# Patient Record
Sex: MALE | Race: ASIAN | NOT HISPANIC OR LATINO | Employment: UNEMPLOYED | URBAN - METROPOLITAN AREA
[De-identification: names, ages, dates, MRNs, and addresses within clinical notes are randomized per-mention and may not be internally consistent; named-entity substitution may affect disease eponyms.]

---

## 2024-05-03 PROBLEM — I10 BENIGN ESSENTIAL HYPERTENSION: Status: ACTIVE | Noted: 2024-05-03

## 2024-05-03 PROBLEM — E11.65 TYPE 2 DIABETES MELLITUS WITH HYPERGLYCEMIA  (CMD): Status: ACTIVE | Noted: 2024-05-03

## 2024-05-03 PROBLEM — K40.90 RIGHT INGUINAL HERNIA: Status: ACTIVE | Noted: 2024-05-03

## 2024-05-03 PROBLEM — E78.5 HYPERLIPIDEMIA: Status: ACTIVE | Noted: 2024-05-03

## 2024-05-03 PROBLEM — K21.00 GASTROESOPHAGEAL REFLUX DISEASE WITH ESOPHAGITIS: Status: ACTIVE | Noted: 2024-05-03

## 2024-05-03 PROBLEM — L30.9 ECZEMA: Status: ACTIVE | Noted: 2024-05-03

## 2024-06-01 PROBLEM — Z12.11 ENCOUNTER FOR SCREENING FOR MALIGNANT NEOPLASM OF COLON: Status: ACTIVE | Noted: 2024-06-01

## 2024-07-02 LAB — COLONOSCOPY STUDY: ABNORMAL

## 2024-09-18 ENCOUNTER — CLINICAL ABSTRACT (OUTPATIENT)
Dept: FAMILY MEDICINE | Age: 64
End: 2024-09-18

## 2024-11-21 ENCOUNTER — OFFICE VISIT (OUTPATIENT)
Dept: FAMILY MEDICINE CLINIC | Facility: CLINIC | Age: 64
End: 2024-11-21

## 2024-11-21 VITALS
HEART RATE: 72 BPM | DIASTOLIC BLOOD PRESSURE: 68 MMHG | OXYGEN SATURATION: 98 % | SYSTOLIC BLOOD PRESSURE: 112 MMHG | BODY MASS INDEX: 22.66 KG/M2 | HEIGHT: 66 IN | WEIGHT: 141 LBS

## 2024-11-21 DIAGNOSIS — Z23 NEED FOR INFLUENZA VACCINATION: ICD-10-CM

## 2024-11-21 DIAGNOSIS — I10 BENIGN ESSENTIAL HYPERTENSION: ICD-10-CM

## 2024-11-21 DIAGNOSIS — E78.2 MIXED HYPERLIPIDEMIA: ICD-10-CM

## 2024-11-21 DIAGNOSIS — K21.00 GASTROESOPHAGEAL REFLUX DISEASE WITH ESOPHAGITIS WITHOUT HEMORRHAGE: ICD-10-CM

## 2024-11-21 DIAGNOSIS — E11.65 TYPE 2 DIABETES MELLITUS WITH HYPERGLYCEMIA, WITHOUT LONG-TERM CURRENT USE OF INSULIN (HCC): Primary | ICD-10-CM

## 2024-11-21 PROBLEM — E78.5 HYPERLIPIDEMIA: Status: ACTIVE | Noted: 2024-05-03

## 2024-11-21 LAB — HEMOGLOBIN A1C: 6.9 % (ref 4.3–5.6)

## 2024-11-21 PROCEDURE — 3008F BODY MASS INDEX DOCD: CPT

## 2024-11-21 PROCEDURE — 90471 IMMUNIZATION ADMIN: CPT

## 2024-11-21 PROCEDURE — 3078F DIAST BP <80 MM HG: CPT

## 2024-11-21 PROCEDURE — 3044F HG A1C LEVEL LT 7.0%: CPT

## 2024-11-21 PROCEDURE — 90656 IIV3 VACC NO PRSV 0.5 ML IM: CPT

## 2024-11-21 PROCEDURE — 3074F SYST BP LT 130 MM HG: CPT

## 2024-11-21 PROCEDURE — 99204 OFFICE O/P NEW MOD 45 MIN: CPT

## 2024-11-21 PROCEDURE — 83036 HEMOGLOBIN GLYCOSYLATED A1C: CPT

## 2024-11-21 RX ORDER — LISINOPRIL AND HYDROCHLOROTHIAZIDE 10; 12.5 MG/1; MG/1
1 TABLET ORAL DAILY
Qty: 90 TABLET | Refills: 1 | Status: SHIPPED | OUTPATIENT
Start: 2024-11-21 | End: 2025-05-20

## 2024-11-21 RX ORDER — ATORVASTATIN CALCIUM 40 MG/1
40 TABLET, FILM COATED ORAL DAILY
COMMUNITY
Start: 2024-05-31 | End: 2024-11-21

## 2024-11-21 RX ORDER — ATORVASTATIN CALCIUM 40 MG/1
40 TABLET, FILM COATED ORAL DAILY
Qty: 90 TABLET | Refills: 1 | Status: SHIPPED | OUTPATIENT
Start: 2024-11-21 | End: 2025-05-20

## 2024-11-21 RX ORDER — LISINOPRIL AND HYDROCHLOROTHIAZIDE 10; 12.5 MG/1; MG/1
1 TABLET ORAL DAILY
COMMUNITY
End: 2024-11-21

## 2024-11-21 NOTE — PROGRESS NOTES
Subjective:   Jay Sylvester is a 64 year old male who presents for Medication Request (Medication refills ). Patient is here today with Spouse.    Patient is here for follow up of his chronic medical problems. Patient is compliant with their medications and has no side effects. There is not acute issue present at this time. The patient is requesting refills at this time. The patient states that their medications have been helpful and effective.     Patient's hypertension has been well controlled. He has been taking his medication regularly. There have not been any side effects. He complains of no hypertensive related symptoms and denies headache, blurred vision, vision changes, edema, epistaxis, chest pain, chest pressure, chest heaviness, chest tightness, shortness of breath, dypsnea on exertion, palpitations, paroxysmal nocturnal dyspnea, and orthopnea. Patient is checks BP once a week at home.  Diet/exercise: Pt reports he is exercising and is watching salt in diet.    The patient diabetes has been controlled. The patient's current diabetic regimen is medication. He checks his blood sugar once per day. Review of blood sugars indicates an average blood sugar of 112. He is not having hypoglycemic events. Patient is currently compliant with his diet and exercise plan. Current symptoms include none . He denies blurry vision, increased fatigue, polydipsia, polyphagia, polyuria, poor wound healing, pruritus, skin dryness, and weight loss.     Patient requesting flu shot at this time.     History/Other:      Chief Complaint Reviewed and Verified  Nursing Notes Reviewed and   Verified  Tobacco Reviewed  Allergies Reviewed  Medications Reviewed    Problem List Reviewed  Medical History Reviewed  Surgical History   Reviewed  Family History Reviewed  Social History Reviewed           Tobacco:  He has never smoked tobacco.      Current Outpatient Medications   Medication Sig Dispense Refill    atorvastatin 40 MG  Oral Tab Take 1 tablet (40 mg total) by mouth daily. 90 tablet 1    lisinopril-hydroCHLOROthiazide 10-12.5 MG Oral Tab Take 1 tablet by mouth daily. 90 tablet 1    metFORMIN 500 MG Oral Tab Take 1 tablet (500 mg total) by mouth daily. 90 tablet 1    omeprazole 20 MG Oral Capsule Delayed Release Take 1 capsule (20 mg total) by mouth daily. (Patient not taking: Reported on 11/21/2024)         Allergies[1]    Depression Screening (PHQ-2/PHQ-9): Over the LAST 2 WEEKS   Little interest or pleasure in doing things: Not at all    Feeling down, depressed, or hopeless: Not at all    PHQ-2 SCORE: 0           Review of Systems   Constitutional: Negative.  Negative for activity change and fatigue.   HENT: Negative.  Negative for congestion, ear pain, rhinorrhea and sneezing.    Eyes: Negative.  Negative for redness.   Respiratory: Negative.  Negative for cough, shortness of breath and wheezing.    Cardiovascular: Negative.  Negative for chest pain.   Gastrointestinal: Negative.  Negative for abdominal pain, constipation, diarrhea, nausea and vomiting.   Endocrine: Negative.    Genitourinary: Negative.  Negative for difficulty urinating and frequency.   Musculoskeletal: Negative.  Negative for back pain, joint swelling and myalgias.   Skin: Negative.  Negative for rash.   Allergic/Immunologic: Negative.    Neurological: Negative.  Negative for dizziness, syncope, light-headedness and headaches.   Hematological: Negative.    Psychiatric/Behavioral: Negative.           Objective:   /68 (BP Location: Right arm, Patient Position: Sitting, Cuff Size: adult)   Pulse 72   Ht 5' 6\" (1.676 m)   Wt 141 lb (64 kg)   SpO2 98%   BMI 22.76 kg/m²  Estimated body mass index is 22.76 kg/m² as calculated from the following:    Height as of this encounter: 5' 6\" (1.676 m).    Weight as of this encounter: 141 lb (64 kg).      Physical Exam  Vitals and nursing note reviewed.   Constitutional:       Appearance: Normal appearance. He is  normal weight.   HENT:      Head: Normocephalic and atraumatic.      Right Ear: Tympanic membrane normal.      Left Ear: Tympanic membrane normal.      Nose: Nose normal.      Mouth/Throat:      Mouth: Mucous membranes are moist.      Pharynx: Oropharynx is clear.   Eyes:      Extraocular Movements: Extraocular movements intact.      Conjunctiva/sclera: Conjunctivae normal.      Pupils: Pupils are equal, round, and reactive to light.   Cardiovascular:      Rate and Rhythm: Normal rate and regular rhythm.      Pulses: Normal pulses.      Heart sounds: Normal heart sounds.   Pulmonary:      Effort: Pulmonary effort is normal.      Breath sounds: Normal breath sounds.   Abdominal:      General: Abdomen is flat. Bowel sounds are normal.      Palpations: Abdomen is soft.   Musculoskeletal:         General: Normal range of motion.      Cervical back: Normal range of motion and neck supple.   Skin:     General: Skin is warm and dry.   Neurological:      General: No focal deficit present.      Mental Status: He is alert and oriented to person, place, and time. Mental status is at baseline.   Psychiatric:         Mood and Affect: Mood normal.         Behavior: Behavior normal.         Thought Content: Thought content normal.         Judgment: Judgment normal.       Bilateral barefoot skin diabetic exam is normal, visualized feet and the appearance is normal.  Bilateral monofilament/sensation of both feet is normal.  Pulsation pedal pulse exam of both lower legs/feet is normal as well.       Assessment & Plan:     1. Type 2 diabetes mellitus with hyperglycemia, without long-term current use of insulin ([facility])  - POC Hemoglobin A1C  - metFORMIN 500 MG Oral Tab; Take 1 tablet (500 mg total) by mouth daily.  Dispense: 90 tablet; Refill: 1  -Stable, current management   -Continue home blood glucose monitoring   -Educated about healthy diet and lifestyle changes for overall wellness  -Discussed about 30 minutes of daily  exercise    2. Benign essential hypertension  - lisinopril-hydroCHLOROthiazide 10-12.5 MG Oral Tab; Take 1 tablet by mouth daily.  Dispense: 90 tablet; Refill: 1  -Educated patient about the benefits of DASH diet and daily activity.  -Discussed with patient about how to monitoring BP at home and continue medication daily.  -Advised patient to call if BP is persistently elevated at home and will follow up in 6 months or sooner if needed.     3. Mixed hyperlipidemia  - atorvastatin 40 MG Oral Tab; Take 1 tablet (40 mg total) by mouth daily.  Dispense: 90 tablet; Refill: 1  -Recommend low fat/low cholesterol diet   -Advised to decrease the amount of carbohydrates in diet (bread products, rice, pasta, potatoes, cereals, starchy vegetables)  -Avoid fried, fatty, greasy foods, cheese, red-meats, egg yolks and processed foods  -Recommend to cut out foods and beverages that contain high sugars  -Increase your vegetable intake  -Try to exercise at least 30 minutes per day 4-5 times per week of cardio and weight training     4. Gastroesophageal reflux disease with esophagitis without hemorrhage  - omeprazole 20 MG Oral Capsule Delayed Release; Take 1 capsule (20 mg total) by mouth daily. (Patient not taking: Reported on 11/21/2024)  -Advised to take medication as directed  -Patient recommended to avoid spicy, fried foods, foods made with citric fruit such as gabino, tomatoes, and oranges  -Patient to avoid NSAID (naproxen/Aleve, ibuprofen/Motrin/Advil)  -Should minimize the use of Caffeine (in coffee, tea, chocolate, sodas, and energy drinks)  -Patient should cut out tobacco use and alcohol use  -Encouraged to eat small portions and not to eat late night  -Having head and upper body raised when laying down helps limit reflux     5. Need for influenza vaccination  - INFLUENZA VACCINE, TRI, PRESERV FREE, 0.5 ML         Medication use, effects and side effects discussed in detail with patient. The patient and Spouse indicated  understanding of the diagnosis and agreed with the plan of care.    Return in about 6 months (around 5/21/2025) for Blood pressure, A1C.    CHUCK Vargas         [1] No Known Allergies

## 2025-05-23 DIAGNOSIS — E78.2 MIXED HYPERLIPIDEMIA: ICD-10-CM

## 2025-05-29 RX ORDER — ATORVASTATIN CALCIUM 40 MG/1
40 TABLET, FILM COATED ORAL DAILY
Qty: 90 TABLET | Refills: 3 | Status: SHIPPED | OUTPATIENT
Start: 2025-05-29

## 2025-07-08 ENCOUNTER — OFFICE VISIT (OUTPATIENT)
Dept: FAMILY MEDICINE CLINIC | Facility: CLINIC | Age: 65
End: 2025-07-08

## 2025-07-08 VITALS
BODY MASS INDEX: 22.82 KG/M2 | HEIGHT: 66 IN | HEART RATE: 68 BPM | OXYGEN SATURATION: 100 % | DIASTOLIC BLOOD PRESSURE: 73 MMHG | WEIGHT: 142 LBS | SYSTOLIC BLOOD PRESSURE: 117 MMHG

## 2025-07-08 DIAGNOSIS — K40.90 INGUINAL HERNIA OF RIGHT SIDE WITHOUT OBSTRUCTION OR GANGRENE: ICD-10-CM

## 2025-07-08 DIAGNOSIS — K21.00 GASTROESOPHAGEAL REFLUX DISEASE WITH ESOPHAGITIS WITHOUT HEMORRHAGE: ICD-10-CM

## 2025-07-08 DIAGNOSIS — E11.65 TYPE 2 DIABETES MELLITUS WITH HYPERGLYCEMIA, WITHOUT LONG-TERM CURRENT USE OF INSULIN (HCC): ICD-10-CM

## 2025-07-08 DIAGNOSIS — Z12.5 SCREENING FOR MALIGNANT NEOPLASM OF PROSTATE: ICD-10-CM

## 2025-07-08 DIAGNOSIS — E78.2 MIXED HYPERLIPIDEMIA: ICD-10-CM

## 2025-07-08 DIAGNOSIS — Z00.01 ENCOUNTER FOR GENERAL ADULT MEDICAL EXAMINATION WITH ABNORMAL FINDINGS: Primary | ICD-10-CM

## 2025-07-08 DIAGNOSIS — E55.9 VITAMIN D DEFICIENCY: ICD-10-CM

## 2025-07-08 DIAGNOSIS — I10 BENIGN ESSENTIAL HYPERTENSION: ICD-10-CM

## 2025-07-08 RX ORDER — ATORVASTATIN CALCIUM 40 MG/1
40 TABLET, FILM COATED ORAL DAILY
Qty: 90 TABLET | Refills: 3 | Status: SHIPPED | OUTPATIENT
Start: 2025-07-08

## 2025-07-08 RX ORDER — LISINOPRIL 10 MG/1
10 TABLET ORAL DAILY
Qty: 90 TABLET | Refills: 3 | Status: SHIPPED | OUTPATIENT
Start: 2025-07-08

## 2025-07-08 RX ORDER — LISINOPRIL 10 MG/1
10 TABLET ORAL DAILY
COMMUNITY
End: 2025-07-08

## 2025-07-08 NOTE — ASSESSMENT & PLAN NOTE
-Recommend low fat/low cholesterol diet   -Advised to decrease the amount of carbohydrates in diet (bread products, rice, pasta, potatoes, cereals, starchy vegetables)  -Avoid fried, fatty, greasy foods, cheese, red-meats, egg yolks and processed foods  -Recommend to cut out foods and beverages that contain high sugars  -Increase your vegetable intake  -Try to exercise at least 30 minutes per day 4-5 times per week of cardio and weight training   Orders:    atorvastatin 40 MG Oral Tab; Take 1 tablet (40 mg total) by mouth daily.    Lipid Panel; Future     No crepitus, firmness, induration, fluctuance. Skin is normal temp. No erythema/warmth. No obvious skin breaks.  soft compartments   No swelling, normal cap refill, no bony ttp. FROM all fingers/wrist/elbow/shoulder. Sensation intact. Normal shoulder/elbow/hand adduction/abduction. Normal finger opposition. Strength 5/5. No crepitus, firmness, induration, fluctuance. Skin is normal temp. No erythema/warmth. No obvious skin breaks.     Physical Exam:    CONSTITUTIONAL:  Generally well appearing, no acute distress, alert, awake and oriented  HEENT:  Moist mucous membranes, normal voice  PULM:  No accessory muscle use, speaking full sentences  SKIN:  Normal in appearance, normal color

## 2025-07-08 NOTE — ASSESSMENT & PLAN NOTE
-Advised to take medication as directed  -Patient recommended to avoid spicy, fried foods, foods made with citric fruit such as gabino, tomatoes, and oranges  -Patient to avoid NSAID (naproxen/Aleve, ibuprofen/Motrin/Advil)  -Should minimize the use of Caffeine (in coffee, tea, chocolate, sodas, and energy drinks)  -Patient should cut out tobacco use and alcohol use  -Encouraged to eat small portions and not to eat late night  -Having head and upper body raised when laying down helps limit reflux     Orders:    Comp Metabolic Panel (14); Future

## 2025-07-08 NOTE — ASSESSMENT & PLAN NOTE
-Discussed with patient about how to monitoring BP at home and continue medication daily.  -Advised low salt diet.  -Eat less of canned , frozen, dried, packaged and fast food.  -Limit caffeine, alcohol. No smoking.  -Exercise regularly, at least 20 minutes 3 times a week.  -Maintain healthy body weight.   -Avoid stress.  -Lifestyle modification has potential for  improving BP control   Orders:    lisinopril 10 MG Oral Tab; Take 1 tablet (10 mg total) by mouth daily.

## 2025-07-08 NOTE — PROGRESS NOTES
Subjective:   Jay Sylvester is a 64 year old male who presents for Physical (Annual Physical ). Patient is here today with Spouse.    HPI   Patient is here for routine general physical exam.  No changes to chronic medical problems.   Patient is requesting blood testing. Diet and exercise have been fair.  Past medical history, family history, and social history were reviewed.    Patient's hypertension has been well controlled. He Has been taking his medication regularly. There have not been any side effects. He complains of no hypertensive related symptoms and denies headache, blurred vision, vision changes, edema, epistaxis, chest pain, chest pressure, chest heaviness, chest tightness , shortness of breath, dyspnea on exertion, palpitations , paroxysmal nocturnal dyspnea, and orthopnea . Patient checks BP at home once a week. Home SBP ranging from 120-130 and DBP ranging from 65-80.  Diet/exercise: Pt reports he is exercising and is watching salt in diet.     Patient presents for diabetes check up. he has had no change in urination or thirst. He is tolerating medications well. He has had no hypoglycemic reactions.  Denies any visual symptoms. Patient state he  is compliant with watching his diet.   Pt has been checking his  feet on a regular basis. Pt denies any tingling of the feet.     Patient states that he has a history inguinal hernia and has become painful.      History/Other:      Chief Complaint Reviewed and Verified  Nursing Notes Reviewed and   Verified  Tobacco Reviewed  Allergies Reviewed  Medications Reviewed    Problem List Reviewed  Medical History Reviewed  Surgical History   Reviewed  Family History Reviewed  Social History Reviewed           Tobacco:  He has never smoked tobacco.      Current Medications[1]    Allergies[2]    Depression Screening (PHQ-2/PHQ-9): Over the LAST 2 WEEKS   Little interest or pleasure in doing things: Not at all    Feeling down, depressed, or hopeless: Not at  all    PHQ-2 SCORE: 0           Review of Systems   Constitutional: Negative.  Negative for activity change and fatigue.   HENT: Negative.  Negative for congestion, ear pain, rhinorrhea and sneezing.    Eyes: Negative.  Negative for redness.   Respiratory: Negative.  Negative for cough, shortness of breath and wheezing.    Cardiovascular: Negative.  Negative for chest pain.   Gastrointestinal: Negative.  Negative for abdominal pain, constipation, diarrhea, nausea and vomiting.   Endocrine: Negative.    Genitourinary: Negative.  Negative for difficulty urinating and frequency.   Musculoskeletal: Negative.  Negative for back pain, joint swelling and myalgias.   Skin: Negative.  Negative for rash.   Allergic/Immunologic: Negative.    Neurological: Negative.  Negative for dizziness, syncope, light-headedness and headaches.   Hematological: Negative.    Psychiatric/Behavioral: Negative.           Objective:   /73 (BP Location: Left arm, Patient Position: Sitting, Cuff Size: adult)   Pulse 68   Ht 5' 6\" (1.676 m)   Wt 142 lb (64.4 kg)   SpO2 100%   BMI 22.92 kg/m²  Estimated body mass index is 22.92 kg/m² as calculated from the following:    Height as of this encounter: 5' 6\" (1.676 m).    Weight as of this encounter: 142 lb (64.4 kg).      Physical Exam  Vitals and nursing note reviewed.   Constitutional:       Appearance: Normal appearance. He is normal weight.   HENT:      Head: Normocephalic and atraumatic.      Right Ear: Tympanic membrane normal.      Left Ear: Tympanic membrane normal.      Nose: Nose normal.      Mouth/Throat:      Mouth: Mucous membranes are moist.      Pharynx: Oropharynx is clear.   Eyes:      Extraocular Movements: Extraocular movements intact.      Conjunctiva/sclera: Conjunctivae normal.      Pupils: Pupils are equal, round, and reactive to light.   Cardiovascular:      Rate and Rhythm: Normal rate and regular rhythm.      Pulses: Normal pulses.      Heart sounds: Normal heart  sounds.   Pulmonary:      Effort: Pulmonary effort is normal.      Breath sounds: Normal breath sounds.   Abdominal:      General: Abdomen is flat. Bowel sounds are normal.      Palpations: Abdomen is soft.   Musculoskeletal:         General: Normal range of motion.      Cervical back: Normal range of motion and neck supple.   Skin:     General: Skin is warm and dry.   Neurological:      General: No focal deficit present.      Mental Status: He is alert and oriented to person, place, and time. Mental status is at baseline.   Psychiatric:         Mood and Affect: Mood normal.         Behavior: Behavior normal.         Thought Content: Thought content normal.         Judgment: Judgment normal.           Assessment & Plan:     Assessment & Plan  Encounter for general adult medical examination with abnormal findings  -Exam is unremarkable  -Screening tests/lab were discussed   -Discussed with the patient about age appropriate screening and immunization.  -Advised healthy lifestyle with regular exercise and modification of the diet  -Advised 30 minutes of aerobic exercise or weightbearing exercise daily for at least 5 times a week.  -Advised to increase the intake of vegetables, fibers and lean protein.   -Avoid high fat, high cholesterol, high carb diet.  -Avoid processed, frozen food and sweetened beverages.  -Advised to call for any new problems and follow up for further management after testings have been done   Orders:    CBC With Differential With Platelet; Future    Comp Metabolic Panel (14); Future    Lipid Panel; Future    TSH W Reflex To Free T4    Mixed hyperlipidemia  -Recommend low fat/low cholesterol diet   -Advised to decrease the amount of carbohydrates in diet (bread products, rice, pasta, potatoes, cereals, starchy vegetables)  -Avoid fried, fatty, greasy foods, cheese, red-meats, egg yolks and processed foods  -Recommend to cut out foods and beverages that contain high sugars  -Increase your vegetable  intake  -Try to exercise at least 30 minutes per day 4-5 times per week of cardio and weight training   Orders:    atorvastatin 40 MG Oral Tab; Take 1 tablet (40 mg total) by mouth daily.    Lipid Panel; Future    Gastroesophageal reflux disease with esophagitis without hemorrhage  -Advised to take medication as directed  -Patient recommended to avoid spicy, fried foods, foods made with citric fruit such as gabino, tomatoes, and oranges  -Patient to avoid NSAID (naproxen/Aleve, ibuprofen/Motrin/Advil)  -Should minimize the use of Caffeine (in coffee, tea, chocolate, sodas, and energy drinks)  -Patient should cut out tobacco use and alcohol use  -Encouraged to eat small portions and not to eat late night  -Having head and upper body raised when laying down helps limit reflux     Orders:    Comp Metabolic Panel (14); Future    Benign essential hypertension  -Discussed with patient about how to monitoring BP at home and continue medication daily.  -Advised low salt diet.  -Eat less of canned , frozen, dried, packaged and fast food.  -Limit caffeine, alcohol. No smoking.  -Exercise regularly, at least 20 minutes 3 times a week.  -Maintain healthy body weight.   -Avoid stress.  -Lifestyle modification has potential for  improving BP control   Orders:    lisinopril 10 MG Oral Tab; Take 1 tablet (10 mg total) by mouth daily.    Type 2 diabetes mellitus with hyperglycemia, without long-term current use of insulin ([facility])  -Stable, current management and follow ups with endocrinologist.   -Continue home blood glucose monitoring   -Educated about healthy diet and lifestyle changes for overall wellness  -Discussed about 30 minutes of daily exercise   Orders:    metFORMIN 500 MG Oral Tab; Take 1 tablet (500 mg total) by mouth daily.    Hemoglobin A1C    Microalb/Creat Ratio, Random Urine; Future    Screening for malignant neoplasm of prostate    Orders:    PSA, TOTAL [5363] [Q]    Vitamin D deficiency    Orders:    Vitamin D,  25-Hydroxy    Inguinal hernia of right side without obstruction or gangrene  -will order Ultrasound  -Referral given for surgery  Orders:    US PELVIS (TRANSABDOMINAL PELVIS)  (CPT=76856); Future    SURGERY - INTERNAL           Medication use, effects and side effects discussed in detail with patient. The patient and Spouse indicated understanding of the diagnosis and agreed with the plan of care.    Return in about 6 months (around 1/8/2026) for Blood pressure, Medication Check, A1C.    CHUCK Vargas         [1]   Current Outpatient Medications   Medication Sig Dispense Refill    atorvastatin 40 MG Oral Tab Take 1 tablet (40 mg total) by mouth daily. 90 tablet 3    metFORMIN 500 MG Oral Tab Take 1 tablet (500 mg total) by mouth daily. 90 tablet 3    lisinopril 10 MG Oral Tab Take 1 tablet (10 mg total) by mouth daily. 90 tablet 3    omeprazole 20 MG Oral Capsule Delayed Release Take 1 capsule (20 mg total) by mouth daily. (Patient not taking: Reported on 7/8/2025)     [2] No Known Allergies

## 2025-07-08 NOTE — ASSESSMENT & PLAN NOTE
-Stable, current management and follow ups with endocrinologist.   -Continue home blood glucose monitoring   -Educated about healthy diet and lifestyle changes for overall wellness  -Discussed about 30 minutes of daily exercise   Orders:    metFORMIN 500 MG Oral Tab; Take 1 tablet (500 mg total) by mouth daily.    Hemoglobin A1C    Microalb/Creat Ratio, Random Urine; Future

## 2025-07-15 LAB
ABSOLUTE BASOPHILS: 31 CELLS/UL (ref 0–200)
ABSOLUTE EOSINOPHILS: 172 CELLS/UL (ref 15–500)
ABSOLUTE LYMPHOCYTES: 1638 CELLS/UL (ref 850–3900)
ABSOLUTE MONOCYTES: 499 CELLS/UL (ref 200–950)
ABSOLUTE NEUTROPHILS: 2860 CELLS/UL (ref 1500–7800)
ALBUMIN/GLOBULIN RATIO: 1.4 (CALC) (ref 1–2.5)
ALBUMIN: 4.6 G/DL (ref 3.6–5.1)
ALKALINE PHOSPHATASE: 78 U/L (ref 35–144)
ALT: 17 U/L (ref 9–46)
AST: 17 U/L (ref 10–35)
BASOPHILS: 0.6 %
BILIRUBIN, TOTAL: 0.5 MG/DL (ref 0.2–1.2)
BUN: 16 MG/DL (ref 7–25)
CALCIUM: 9.5 MG/DL (ref 8.6–10.3)
CARBON DIOXIDE: 28 MMOL/L (ref 20–32)
CHLORIDE: 99 MMOL/L (ref 98–110)
CHOL/HDLC RATIO: 3.5 (CALC)
CHOLESTEROL, TOTAL: 162 MG/DL
CREATININE, RANDOM URINE: 105 MG/DL (ref 20–320)
CREATININE: 0.98 MG/DL (ref 0.7–1.35)
EGFR: 87 ML/MIN/1.73M2
EOSINOPHILS: 3.3 %
GLOBULIN: 3.2 G/DL (CALC) (ref 1.9–3.7)
GLUCOSE: 110 MG/DL (ref 65–99)
HDL CHOLESTEROL: 46 MG/DL
HEMATOCRIT: 40.8 % (ref 38.5–50)
HEMOGLOBIN A1C: 6.8 %
HEMOGLOBIN: 13.2 G/DL (ref 13.2–17.1)
LDL-CHOLESTEROL: 96 MG/DL (CALC)
LYMPHOCYTES: 31.5 %
MCH: 28.9 PG (ref 27–33)
MCHC: 32.4 G/DL (ref 32–36)
MCV: 89.5 FL (ref 80–100)
MICROALBUMIN/CREATININE RATIO, RANDOM URINE: 9 MG/G CREAT
MICROALBUMIN: 0.9 MG/DL
MONOCYTES: 9.6 %
MPV: 10.2 FL (ref 7.5–12.5)
NEUTROPHILS: 55 %
NON-HDL CHOLESTEROL: 116 MG/DL (CALC)
PLATELET COUNT: 238 THOUSAND/UL (ref 140–400)
POTASSIUM: 4.4 MMOL/L (ref 3.5–5.3)
PROTEIN, TOTAL: 7.8 G/DL (ref 6.1–8.1)
PSA, TOTAL: 1.12 NG/ML
RDW: 13.1 % (ref 11–15)
RED BLOOD CELL COUNT: 4.56 MILLION/UL (ref 4.2–5.8)
SODIUM: 137 MMOL/L (ref 135–146)
TRIGLYCERIDES: 107 MG/DL
TSH W/REFLEX TO FT4: 0.98 MIU/L (ref 0.4–4.5)
VITAMIN D, 25-OH, TOTAL: 36 NG/ML (ref 30–100)
WHITE BLOOD CELL COUNT: 5.2 THOUSAND/UL (ref 3.8–10.8)